# Patient Record
Sex: MALE | Race: WHITE | HISPANIC OR LATINO | Employment: STUDENT | URBAN - METROPOLITAN AREA
[De-identification: names, ages, dates, MRNs, and addresses within clinical notes are randomized per-mention and may not be internally consistent; named-entity substitution may affect disease eponyms.]

---
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
## 2025-01-28 ENCOUNTER — HOSPITAL ENCOUNTER (EMERGENCY)
Facility: HOSPITAL | Age: 25
Discharge: HOME | End: 2025-01-29
Attending: EMERGENCY MEDICINE

## 2025-01-28 DIAGNOSIS — T14.90XA TRAUMA: ICD-10-CM

## 2025-01-28 DIAGNOSIS — S50.11XA CONTUSION OF RIGHT FOREARM, INITIAL ENCOUNTER: Primary | ICD-10-CM

## 2025-01-28 PROCEDURE — 99283 EMERGENCY DEPT VISIT LOW MDM: CPT | Mod: 25

## 2025-01-28 RX ORDER — OXYCODONE HYDROCHLORIDE 5 MG/1
5 TABLET ORAL
Status: DISCONTINUED | OUTPATIENT
Start: 2025-01-28 | End: 2025-01-29

## 2025-01-29 VITALS
DIASTOLIC BLOOD PRESSURE: 93 MMHG | TEMPERATURE: 98 F | RESPIRATION RATE: 16 BRPM | HEART RATE: 82 BPM | OXYGEN SATURATION: 98 % | SYSTOLIC BLOOD PRESSURE: 141 MMHG | HEIGHT: 70 IN | WEIGHT: 315 LBS | BODY MASS INDEX: 45.1 KG/M2

## 2025-01-29 PROCEDURE — 25000003 PHARM REV CODE 250: Performed by: EMERGENCY MEDICINE

## 2025-01-29 RX ORDER — NAPROXEN 500 MG/1
500 TABLET ORAL
Status: COMPLETED | OUTPATIENT
Start: 2025-01-29 | End: 2025-01-29

## 2025-01-29 RX ORDER — NAPROXEN 500 MG/1
500 TABLET ORAL 2 TIMES DAILY PRN
Qty: 20 TABLET | Refills: 0 | Status: SHIPPED | OUTPATIENT
Start: 2025-01-29 | End: 2025-02-12

## 2025-01-29 RX ADMIN — NAPROXEN 500 MG: 500 TABLET ORAL at 12:01

## 2025-01-29 NOTE — DISCHARGE INSTRUCTIONS
Wear sling as needed for 1-2 days for comfort.  Do not use sling after 3 days as this can lead to reduced motion in your shoulder and elbow.

## 2025-01-29 NOTE — FIRST PROVIDER EVALUATION
Medical screening examination initiated.  I have conducted a focused provider triage encounter, findings are as follows:    Brief history of present illness:      Right arm/wrist pain  Heavy object fell on his arm while working on car      Vitals:    01/28/25 2039   BP: (!) 200/118   BP Location: Left forearm   Pulse: 95   Resp: 19   Temp: 98.2 °F (36.8 °C)   TempSrc: Oral   SpO2: 99%       Pertinent physical exam:      A&Ox3  2+ RP RUE  Pain with palpation of right forearm    Brief workup plan:      XRs    Preliminary workup initiated; this workup will be continued and followed by the physician or advanced practice provider that is assigned to the patient when roomed.

## 2025-01-29 NOTE — ED NOTES
Patient arrived to the ED complaining of right arm pain. Patient had a heavy metal object fall on his right forearm while working on a car. Sling applied in triage.     Pt identifiers Ricardo Hernadezcarol and correct  LOC: The patient is awake, alert, aware of environment with an appropriate affect. Oriented x3, speaking appropriately  APPEARANCE: Pt resting comfortably, in no acute distress, pt is clean and well groomed, clothing properly fastened  SKIN: Skin warm. +Swellin on his right forearm, slig applied in triage   RESPIRATORY: Airway is open and patent, respirations are spontaneous, even and unlabored, normal effort and rate  CARDIAC: Normal rate and rhythm, no peripheral edema noted, capillary refill < 3 seconds, bilateral radial pulses 2+ +hypertensive   ABDOMEN: Soft, nontender, nondistended. Bowel sounds present   NEUROLOGIC: PERRL, facial expression is symmetrical, patient moving all extremities spontaneously, normal sensation in all extremities when touched with a finger.  Follows all commands appropriately  MUSCULOSKELETAL: No obvious deformities. +Decreased ROM on r arm, swelling noted

## 2025-01-29 NOTE — ED PROVIDER NOTES
"Chief Complaint   Arm Injury (Had a heavy piece of metal fall on R forearm while working on a car. Swelling noted R wrist.)      History Of Present Illness   Ricardo Sherwood is a 24 y.o. male presenting with right forearm pain after a heavy metal object fell on his right forearm while he was working on a car.  No other trauma.  He feels some tingling in his fingertips but is able to move them.  No history of fracture to that area.      Review of patient's allergies indicates:  No Known Allergies    No current facility-administered medications on file prior to encounter.     Current Outpatient Medications on File Prior to Encounter   Medication Sig Dispense Refill    fluticasone propionate (FLONASE) 50 mcg/actuation nasal spray 1 spray (50 mcg total) by Each Nostril route 2 (two) times daily. 15 g 0    levocetirizine (XYZAL) 5 MG tablet Take 1 tablet (5 mg total) by mouth every evening. 30 tablet 0    ondansetron (ZOFRAN-ODT) 8 MG TbDL Take 1 tablet (8 mg total) by mouth every 8 (eight) hours as needed (nausea). 10 tablet 0    promethazine-dextromethorphan (PROMETHAZINE-DM) 6.25-15 mg/5 mL Syrp Take 5 mLs by mouth every 6 (six) hours as needed (cough). 60 mL 0    [DISCONTINUED] ibuprofen (ADVIL,MOTRIN) 100 mg/5 mL suspension Take 59 mLs (1,180 mg total) by mouth every 6 (six) hours as needed for Pain. 120 mL 0       Past History   As per HPI and below:  No past medical history on file.  No past surgical history on file.    Social History     Tobacco Use    Smoking status: Never    Smokeless tobacco: Never       No family history on file.    Physical Exam     Vitals:    01/28/25 2039 01/28/25 2317 01/29/25 0042   BP: (!) 200/118  (!) 141/93   BP Location: Left forearm     Pulse: 95  82   Resp: 19  16   Temp: 98.2 °F (36.8 °C)  98 °F (36.7 °C)   TempSrc: Oral     SpO2: 99%  98%   Weight:  (!) 176.9 kg (390 lb)    Height:  5' 10" (1.778 m)      Appearance: No acute distress.  Integument: No ecchymoses or other " signs of trauma.  Musculoskeletal: Good range of motion of all joints.  Pain with supination and pronation of right hand.  Tenderness over midshaft right forearm, no obvious deformity or trauma.  Neurologic: Motor intact.  Sensation intact.    Mental status: Alert and oriented x 3.  GCS 15.      Initial MDM   Direct blow to the middle right forearm with associated pain.  No obvious deformity.  No ecchymosis/abrasion.  Will obtain x-rays.      Medications Given     Medications   naproxen tablet 500 mg (500 mg Oral Given 1/29/25 0008)       Results and Course   Abnormal Labs Reviewed - No abnormal labs to display    Imaging Results              X-Ray Wrist Complete Right (Final result)  Result time 01/29/25 00:12:45      Final result by Ajit Llanos MD (01/29/25 00:12:45)                   Impression:      Soft tissue edema.  No acute displaced fracture.      Electronically signed by: Ajit Llanos MD  Date:    01/29/2025  Time:    00:12               Narrative:    EXAMINATION:  XR FOREARM RIGHT; XR WRIST COMPLETE 3 VIEWS RIGHT    CLINICAL HISTORY:  Injury, unspecified, initial encounter    TECHNIQUE:  AP and lateral views of the right forearm were performed.  Three views of the right wrist also obtained.    COMPARISON:  None    FINDINGS:  Right wrist: No acute displaced fracture.  No dislocation.  Soft tissue edema.  No unexpected radiopaque foreign body.    Right forearm: No acute displaced fracture.  No dislocation.  Soft tissue edema.  No unexpected radiopaque foreign body.                                       X-Ray Forearm Right (Final result)  Result time 01/29/25 00:12:45      Final result by Ajit Llanos MD (01/29/25 00:12:45)                   Impression:      Soft tissue edema.  No acute displaced fracture.      Electronically signed by: Ajit Llanos MD  Date:    01/29/2025  Time:    00:12               Narrative:    EXAMINATION:  XR FOREARM RIGHT; XR WRIST COMPLETE 3 VIEWS  RIGHT    CLINICAL HISTORY:  Injury, unspecified, initial encounter    TECHNIQUE:  AP and lateral views of the right forearm were performed.  Three views of the right wrist also obtained.    COMPARISON:  None    FINDINGS:  Right wrist: No acute displaced fracture.  No dislocation.  Soft tissue edema.  No unexpected radiopaque foreign body.    Right forearm: No acute displaced fracture.  No dislocation.  Soft tissue edema.  No unexpected radiopaque foreign body.                                       X-Ray Elbow Complete Right (Final result)  Result time 01/29/25 01:08:00      Final result by Ajit Llanos MD (01/29/25 01:08:00)                   Impression:      No acute displaced fracture.      Electronically signed by: Ajit Llanos MD  Date:    01/29/2025  Time:    01:08               Narrative:    EXAMINATION:  XR ELBOW COMPLETE 3 VIEW RIGHT    CLINICAL HISTORY:  Injury, unspecified, initial encounter    TECHNIQUE:  Three views of the right elbow.    COMPARISON:  None    FINDINGS:  No acute displaced fracture or dislocation.  No sizeable joint effusion.  No unexpected radiopaque foreign body.                                      ED Course as of 01/29/25 0141   Wed Jan 29, 2025   0008 X-Ray Forearm Right  No fracture seen per my independent interpretation.   [DC]   0012 X-Ray Wrist Complete Right  No fracture seen per my independent interpretation.   [DC]   0012 X-Ray Elbow Complete Right  No fracture seen per my independent interpretation.   [DC]      ED Course User Index  [DC] Ace Mcclelland MD               MDM, Impression and Plan   24 y.o. male with contusion of the right forearm.  No fracture seen.  Will let the patient wear sling for comfort for a day or 2.  Recommend ice.  NSAIDs prescribed.  Okay to DC.         Final diagnoses:  [T14.90XA] Trauma  [S50.11XA] Contusion of right forearm, initial encounter (Primary)        ED Disposition Condition    Discharge Stable          ED Prescriptions        Medication Sig Dispense Start Date End Date Auth. Provider    naproxen (NAPROSYN) 500 MG tablet Take 1 tablet (500 mg total) by mouth 2 (two) times daily as needed (pain). 20 tablet 1/29/2025 2/12/2025 Ace Mcclelland MD          Follow-up Information       Follow up With Specialties Details Why Contact Info    Your primary care doctor  In 1 week                 Ace Mcclelland MD  01/29/25 0142